# Patient Record
Sex: FEMALE | Race: WHITE | URBAN - METROPOLITAN AREA
[De-identification: names, ages, dates, MRNs, and addresses within clinical notes are randomized per-mention and may not be internally consistent; named-entity substitution may affect disease eponyms.]

---

## 2017-05-17 LAB
ANTIBODY SCREEN, EXTERNAL: NEGATIVE
CHLAMYDIA, EXTERNAL: NEGATIVE
HBSAG, EXTERNAL: NEGATIVE
HIV, EXTERNAL: NON REACTIVE
N. GONORRHEA, EXTERNAL: NEGATIVE
RUBELLA, EXTERNAL: NORMAL
T. PALLIDUM, EXTERNAL: NEGATIVE
TYPE, ABO & RH, EXTERNAL: NORMAL

## 2017-06-15 LAB — GRBS, EXTERNAL: POSITIVE

## 2017-07-01 ENCOUNTER — ANESTHESIA (OUTPATIENT)
Dept: LABOR AND DELIVERY | Age: 32
DRG: 560 | End: 2017-07-01
Payer: MEDICAID

## 2017-07-01 ENCOUNTER — HOSPITAL ENCOUNTER (INPATIENT)
Age: 32
LOS: 3 days | Discharge: HOME | DRG: 560 | End: 2017-07-04
Attending: OBSTETRICS & GYNECOLOGY | Admitting: OBSTETRICS & GYNECOLOGY
Payer: MEDICAID

## 2017-07-01 ENCOUNTER — ANESTHESIA EVENT (OUTPATIENT)
Dept: LABOR AND DELIVERY | Age: 32
DRG: 560 | End: 2017-07-01
Payer: MEDICAID

## 2017-07-01 PROBLEM — Z34.90 PREGNANCY: Status: ACTIVE | Noted: 2017-07-01

## 2017-07-01 LAB
A1 MICROGLOB PLACENTAL VAG QL: POSITIVE
BASOPHILS # BLD AUTO: 0 K/UL (ref 0–0.1)
BASOPHILS # BLD: 0 % (ref 0–1)
CONTROL LINE PRESENT?: YES
DAILY QC (YES/NO)?: YES
EOSINOPHIL # BLD: 0.1 K/UL (ref 0–0.4)
EOSINOPHIL NFR BLD: 1 % (ref 0–7)
ERYTHROCYTE [DISTWIDTH] IN BLOOD BY AUTOMATED COUNT: 13.9 % (ref 11.5–14.5)
EXPIRATION DATE: NORMAL
HCT VFR BLD AUTO: 34.2 % (ref 35–47)
HGB BLD-MCNC: 11.8 G/DL (ref 11.5–16)
INTERNAL NEGATIVE CONTROL: NEGATIVE
KIT LOT NO.: NORMAL
LYMPHOCYTES # BLD AUTO: 16 % (ref 12–49)
LYMPHOCYTES # BLD: 1.5 K/UL (ref 0.8–3.5)
MCH RBC QN AUTO: 31.8 PG (ref 26–34)
MCHC RBC AUTO-ENTMCNC: 34.5 G/DL (ref 30–36.5)
MCV RBC AUTO: 92.2 FL (ref 80–99)
MONOCYTES # BLD: 0.7 K/UL (ref 0–1)
MONOCYTES NFR BLD AUTO: 7 % (ref 5–13)
NEUTS SEG # BLD: 7.4 K/UL (ref 1.8–8)
NEUTS SEG NFR BLD AUTO: 76 % (ref 32–75)
PH, VAGINAL FLUID: 7 (ref 5–6.1)
PLATELET # BLD AUTO: 179 K/UL (ref 150–400)
RBC # BLD AUTO: 3.71 M/UL (ref 3.8–5.2)
WBC # BLD AUTO: 9.7 K/UL (ref 3.6–11)

## 2017-07-01 PROCEDURE — 74011250636 HC RX REV CODE- 250/636

## 2017-07-01 PROCEDURE — 77030007880 HC KT SPN EPDRL BBMI -B

## 2017-07-01 PROCEDURE — 74011000250 HC RX REV CODE- 250

## 2017-07-01 PROCEDURE — 85025 COMPLETE CBC W/AUTO DIFF WBC: CPT | Performed by: OBSTETRICS & GYNECOLOGY

## 2017-07-01 PROCEDURE — 75410000002 HC LABOR FEE PER 1 HR

## 2017-07-01 PROCEDURE — 83986 ASSAY PH BODY FLUID NOS: CPT | Performed by: OBSTETRICS & GYNECOLOGY

## 2017-07-01 PROCEDURE — 74011000250 HC RX REV CODE- 250: Performed by: ANESTHESIOLOGY

## 2017-07-01 PROCEDURE — 77030034849

## 2017-07-01 PROCEDURE — 65410000002 HC RM PRIVATE OB

## 2017-07-01 PROCEDURE — 74011250636 HC RX REV CODE- 250/636: Performed by: OBSTETRICS & GYNECOLOGY

## 2017-07-01 PROCEDURE — 77030003666 HC NDL SPINAL BD -A

## 2017-07-01 PROCEDURE — 84112 EVAL AMNIOTIC FLUID PROTEIN: CPT | Performed by: OBSTETRICS & GYNECOLOGY

## 2017-07-01 PROCEDURE — 74011000258 HC RX REV CODE- 258: Performed by: OBSTETRICS & GYNECOLOGY

## 2017-07-01 PROCEDURE — 77030014125 HC TY EPDRL BBMI -B: Performed by: ANESTHESIOLOGY

## 2017-07-01 PROCEDURE — 36415 COLL VENOUS BLD VENIPUNCTURE: CPT | Performed by: OBSTETRICS & GYNECOLOGY

## 2017-07-01 PROCEDURE — 99283 EMERGENCY DEPT VISIT LOW MDM: CPT

## 2017-07-01 PROCEDURE — 4A0HXCZ MEASUREMENT OF PRODUCTS OF CONCEPTION, CARDIAC RATE, EXTERNAL APPROACH: ICD-10-PCS | Performed by: OBSTETRICS & GYNECOLOGY

## 2017-07-01 RX ORDER — BUPIVACAINE HYDROCHLORIDE AND EPINEPHRINE 2.5; 5 MG/ML; UG/ML
INJECTION, SOLUTION EPIDURAL; INFILTRATION; INTRACAUDAL; PERINEURAL AS NEEDED
Status: DISCONTINUED | OUTPATIENT
Start: 2017-07-01 | End: 2017-07-02 | Stop reason: HOSPADM

## 2017-07-01 RX ORDER — FENTANYL/BUPIVACAINE/NS/PF 2-1250MCG
PREFILLED PUMP RESERVOIR EPIDURAL
Status: COMPLETED
Start: 2017-07-01 | End: 2017-07-01

## 2017-07-01 RX ORDER — NALOXONE HYDROCHLORIDE 0.4 MG/ML
0.4 INJECTION, SOLUTION INTRAMUSCULAR; INTRAVENOUS; SUBCUTANEOUS AS NEEDED
Status: DISCONTINUED | OUTPATIENT
Start: 2017-07-01 | End: 2017-07-02 | Stop reason: HOSPADM

## 2017-07-01 RX ORDER — SODIUM CHLORIDE 0.9 % (FLUSH) 0.9 %
5-10 SYRINGE (ML) INJECTION AS NEEDED
Status: DISCONTINUED | OUTPATIENT
Start: 2017-07-01 | End: 2017-07-02 | Stop reason: ALTCHOICE

## 2017-07-01 RX ORDER — OXYTOCIN IN 5 % DEXTROSE 30/500 ML
1-25 PLASTIC BAG, INJECTION (ML) INTRAVENOUS
Status: DISCONTINUED | OUTPATIENT
Start: 2017-07-01 | End: 2017-07-02

## 2017-07-01 RX ORDER — FENTANYL/BUPIVACAINE/NS/PF 2-1250MCG
1-16 PREFILLED PUMP RESERVOIR EPIDURAL CONTINUOUS
Status: DISCONTINUED | OUTPATIENT
Start: 2017-07-01 | End: 2017-07-02 | Stop reason: ALTCHOICE

## 2017-07-01 RX ORDER — SODIUM CHLORIDE 0.9 % (FLUSH) 0.9 %
5-10 SYRINGE (ML) INJECTION EVERY 8 HOURS
Status: DISCONTINUED | OUTPATIENT
Start: 2017-07-01 | End: 2017-07-02 | Stop reason: ALTCHOICE

## 2017-07-01 RX ORDER — SODIUM CHLORIDE, SODIUM LACTATE, POTASSIUM CHLORIDE, CALCIUM CHLORIDE 600; 310; 30; 20 MG/100ML; MG/100ML; MG/100ML; MG/100ML
125 INJECTION, SOLUTION INTRAVENOUS CONTINUOUS
Status: DISCONTINUED | OUTPATIENT
Start: 2017-07-01 | End: 2017-07-02 | Stop reason: ALTCHOICE

## 2017-07-01 RX ORDER — SODIUM CHLORIDE 900 MG/100ML
INJECTION INTRAVENOUS
Status: DISPENSED
Start: 2017-07-01 | End: 2017-07-02

## 2017-07-01 RX ORDER — PENICILLIN G POTASSIUM 5000000 [IU]/1
INJECTION, POWDER, FOR SOLUTION INTRAMUSCULAR; INTRAVENOUS
Status: COMPLETED
Start: 2017-07-01 | End: 2017-07-01

## 2017-07-01 RX ORDER — BUPIVACAINE HYDROCHLORIDE AND EPINEPHRINE 5; 5 MG/ML; UG/ML
INJECTION, SOLUTION EPIDURAL; INTRACAUDAL; PERINEURAL
Status: COMPLETED
Start: 2017-07-01 | End: 2017-07-01

## 2017-07-01 RX ORDER — SODIUM CHLORIDE 0.9 % (FLUSH) 0.9 %
5-10 SYRINGE (ML) INJECTION AS NEEDED
Status: DISCONTINUED | OUTPATIENT
Start: 2017-07-01 | End: 2017-07-02 | Stop reason: HOSPADM

## 2017-07-01 RX ADMIN — BUPIVACAINE HYDROCHLORIDE AND EPINEPHRINE 5 ML: 2.5; 5 INJECTION, SOLUTION EPIDURAL; INFILTRATION; INTRACAUDAL; PERINEURAL at 19:57

## 2017-07-01 RX ADMIN — SODIUM CHLORIDE, SODIUM LACTATE, POTASSIUM CHLORIDE, AND CALCIUM CHLORIDE 125 ML/HR: 600; 310; 30; 20 INJECTION, SOLUTION INTRAVENOUS at 20:10

## 2017-07-01 RX ADMIN — BUPIVACAINE HYDROCHLORIDE AND EPINEPHRINE 5 ML: 2.5; 5 INJECTION, SOLUTION EPIDURAL; INFILTRATION; INTRACAUDAL; PERINEURAL at 20:02

## 2017-07-01 RX ADMIN — BUPIVACAINE HYDROCHLORIDE AND EPINEPHRINE 0.5 ML: 2.5; 5 INJECTION, SOLUTION EPIDURAL; INFILTRATION; INTRACAUDAL; PERINEURAL at 19:56

## 2017-07-01 RX ADMIN — SODIUM CHLORIDE, SODIUM LACTATE, POTASSIUM CHLORIDE, AND CALCIUM CHLORIDE 999 ML/HR: 600; 310; 30; 20 INJECTION, SOLUTION INTRAVENOUS at 19:41

## 2017-07-01 RX ADMIN — EPHEDRINE SULFATE 10 MG: 50 INJECTION, SOLUTION INTRAVENOUS at 20:33

## 2017-07-01 RX ADMIN — PENICILLIN G POTASSIUM 2.5 MILLION UNITS: 20000000 POWDER, FOR SOLUTION INTRAVENOUS at 20:27

## 2017-07-01 RX ADMIN — FENTANYL 0.2 MG/100ML-BUPIV 0.125%-NACL 0.9% EPIDURAL INJ 12 ML/HR: 2/0.125 SOLUTION at 20:08

## 2017-07-01 RX ADMIN — SODIUM CHLORIDE 5 MILLION UNITS: 900 INJECTION, SOLUTION INTRAVENOUS at 16:30

## 2017-07-01 RX ADMIN — PENICILLIN G POTASSIUM 5 MILLION UNITS: 5000000 POWDER, FOR SOLUTION INTRAMUSCULAR; INTRAPLEURAL; INTRATHECAL; INTRAVENOUS at 17:25

## 2017-07-01 RX ADMIN — Medication 2 MILLI-UNITS/MIN: at 17:20

## 2017-07-01 RX ADMIN — SODIUM CHLORIDE, SODIUM LACTATE, POTASSIUM CHLORIDE, AND CALCIUM CHLORIDE 125 ML/HR: 600; 310; 30; 20 INJECTION, SOLUTION INTRAVENOUS at 16:29

## 2017-07-01 RX ADMIN — BUPIVACAINE HYDROCHLORIDE AND EPINEPHRINE BITARTRATE: 5; .005 INJECTION, SOLUTION EPIDURAL; INTRACAUDAL; PERINEURAL at 20:00

## 2017-07-01 NOTE — IP AVS SNAPSHOT
[address] [facility], [place] 
[phone] Patient: Soni Salinas MRN: [id number] You are allergic to the following No active allergies Recent Documentation Height Weight Breastfeeding? BMI OB Status Smoking Status 1.626 m 81.2 kg Unknown 30.73 kg/m2 Recent pregnancy Never Smoker Unresulted Labs Order Current Status SAMPLE TO BLOOD BANK In process PH BY NITRAZINE, POC Preliminary result POC RUPTURE OF FETAL MEMBRANE (AMNISURE) Preliminary result Emergency Contacts Name Discharge Info Relation Home Work Mobile Edward Rosenberg  Friend [5] About your hospitalization You were admitted on:  July 1, 2017 You last received care in the:  [facility] You were discharged on:  July 4, 2017 Unit phone number:  [phone] Why you were hospitalized Your primary diagnosis was:  Premature Rupture Of Membranes (Prom), Onset Of Labor Within 24 Hours, Antepartum, Full Term Your diagnoses also included:  Pregnancy, Labor And Delivery Complicated By Cord Around Neck With Compression Providers Seen During Your Hospitalizations Provider Role Specialty Primary office phone Frank Waite MD Attending Provider Obstetrics & Gynecology [phone] Your Primary Care Physician (PCP) Primary Care Physician Office Phone Office Fax UNKNOWN, PROVIDER ** None ** ** None ** Follow-up Information Follow up With Details Comments Contact Info Provider Unknown   Patient not available to ask Frank Waite MD In 6 weeks Follow up with [facility]'s in 6 weeks; call to schedule appointment. [address] [facility] [address] [phone] Current Discharge Medication List  
  
START taking these medications Dose & Instructions Dispensing Information Comments Morning Noon Evening Bedtime  
 ibuprofen 600 mg tablet Commonly known as:  MOTRIN Your last dose was: Your next dose is:    
   
   
 Dose:  600 mg Take 1 Tab by mouth every eight (8) hours as needed for Pain. Quantity:  36 Tab Refills:  0 CONTINUE these medications which have NOT CHANGED Dose & Instructions Dispensing Information Comments Morning Noon Evening Bedtime  
 pnv w/o calcium-iron fum-fa 27-1 mg Tab Your last dose was: Your next dose is: Take  by mouth. Indications: Pregnancy Refills:  0 STOP taking these medications   
 fluticasone 50 mcg/actuation nasal spray Commonly known as:  Arnoldo Lorri Where to Get Your Medications Information on where to get these meds will be given to you by the nurse or doctor. ! Ask your nurse or doctor about these medications  
  ibuprofen 600 mg tablet Discharge Instructions POST DELIVERY DISCHARGE INSTRUCTIONS Name: True Torres YOB: 1985 Primary Diagnosis: Principal Problem: 
  Premature rupture of membranes (PROM), onset of labor within 24 hours, antepartum, full term (7/2/2017) Active Problems: 
  Pregnancy (7/1/2017) Labor and delivery complicated by cord around neck with compression (7/2/2017) General:  
 
Diet/Diet Restrictions: 
· Eight 8-ounce glasses of fluid daily (water, juices); avoid excessive caffeine intake. · Meals/snacks as desired which are high in fiber and carbohydrates and low in fat and cholesterol. Physical Activity / Restrictions / Safety: · Avoid heavy lifting, no more that 8 lbs. For 2-3 weeks;  
· Limit use of stairs to 2 times daily for the first week home. · No driving for one week. · Avoid intercourse 4-6 weeks, no douching or tampon use. · Check with obstetrician before starting or resuming an exercise program.   
 
Discharge Instructions/Special Treatment/Home Care Needs:  
 
· Continue prenatal vitamins. · Continue to use squirt bottle with warm water on your episiotomy after each bathroom use until bleeding stops. · If steri-strips applied to your incision, remove in 7-10 days. Call your doctor for the following: · Fever over 101 degrees by mouth. · Vaginal bleeding heavier than a normal menstrual period or clots larger than a golf ball. · Red streaks or increased swelling of legs, painful red streaks on your breast. 
· Painful urination, constipation and increased pain or swelling or discharge with your incision. · If you feel extremely anxious or overwhelmed. · If you have thoughts of harming yourself and/or your baby. Pain Management:  
 
· Take Acetaminophen (Tylenol) or Ibuprofen (Advil, Motrin), as directed for pain. · Use a warm Sitz bath 3 times daily to relieve episiotomy or hemorrhoidal discomfort. · For hemorrhoidal discomfort, use Tucks and Anusol cream as needed and directed. · Heating pad to  incision as needed. Follow-Up Care:  
 
Appointment with MD: Follow-up Appointments Procedures  FOLLOW UP VISIT Appointment in: 6 Weeks Standing Status:   Standing Number of Occurrences:   1 Order Specific Question:   Appointment in Answer:   6 Weeks Telephone number: [phone] Signed By: Harry Quintana MD                                                                                                   Date: 2017 Time: 8:53 AM 
 
Vaginal Childbirth (Postpartum Care): After Your Visit Your Care Instructions After childbirth (postpartum period), your body goes through many changes. Some of these changes happen over several weeks.   
 
It is easy to get too tired and overwhelmed during the first weeks after childbirth. Take it easy on yourself. You may find it hard to meet the extra demands on your energy and time. Get rest whenever you can, accept help from others, and eat well and drink plenty of fluids. Your body will slowly heal in the next few weeks. You may feel emotional during this time. Changes in your hormones can shift your mood without warning. No heavy lifting. No more than 8 lbs or the size of baby for 2-3 weeks. Avoid stairs. Limit to 1-2 times per day for the 1st week after delivery. No driving for the first week after delivery. Follow-up care is a key part of your treatment and safety. Be sure to make and go to all appointments, and call your doctor if you are having problems. Its also a good idea to know your test results and keep a list of the medicines you take. Around 4 to 6 weeks after your baby's birth, you will have a follow-up visit with your doctor. This visit is your time to talk to your doctor about anything you are concerned or curious about. Keep a list of questions to bring to your postpartum visit. Your questions might be about: 
Changes in your breasts, such as lumps or soreness. When to expect your menstrual period to start again. What form of birth control is best for you. Weight you have put on during the pregnancy. Exercise options. What foods and drinks are best for you, especially if you are breast-feeding. Problems you might be having with breast-feeding. When you can have sex. Some women may want to talk about lubricants for the vagina. Any feelings of sadness or restlessness that you are having. How can you care for yourself at home? Vaginal bleeding and cramps After delivery, you will have a bloody discharge from the vagina. This will turn pink within a week and then white or yellow after about 10 days. It may last for 2 to 4 weeks or longer, until the uterus has healed. Do not rinse inside your vagina with fluids (douche). Do not worry if you pass some blood clots, as long as they are smaller than a golf ball. If you have a tear or stitches in your vaginal area, change the pad at least every 4 hours to prevent soreness and infection. You may have cramps for the first few days after childbirth. These are normal and occur as the uterus shrinks to normal size. Take an over-the-counter pain medicine, such as acetaminophen (Tylenol), ibuprofen (Advil, Motrin), or naproxen (Aleve), for cramps. Read and follow all instructions on the label. Do not take aspirin, because it can cause more bleeding. Do not take two or more pain medicines at the same time unless the doctor told you to. Many pain medicines have acetaminophen, which is Tylenol. Too much acetaminophen (Tylenol) can be harmful. Stitches If you have stitches, they will dissolve on their own and do not need to be removed. Follow your doctor's instructions for cleaning the stitched area. Put ice or a cold pack on your painful area for 10 to 20 minutes at a time. , several times a day, for the first few days. Put a thin cloth between the ice and your skin. Sit in a few inches of warm water (sitz bath) 3 times a day and after bowel movements. The warm water helps with pain and itching. If you do not have a tub, a warm shower might help. Keep the area clean by pouring or spraying warm water over the area outside your vagina and anus after you use the toilet. Breast fullness Your breasts may overfill (engorge) in the first few days after delivery. To help milk flow and to relieve pain, warm your breasts in the shower or by using warm, moist towels before nursing. If you are not nursing, do not put warmth on your breasts or touch your breasts. Wear a tight bra or sports bra and use ice until the fullness goes away. This usually takes 2 to 3 days. Put ice or a cold pack on your breast after nursing to reduce swelling and pain. Put a thin cloth between the ice and your skin. Activity Eat a balanced diet. Do not try to lose weight by cutting calories. Keep taking your prenatal vitamins, or take a multivitamin. ·  Get help with household chores from family or friends, if you can. Do not try to do it all yourself. Get as much rest as you can. Try to take naps when your baby sleeps during the day. Get some exercise every day. But do not do any heavy exercise until your doctor says it is okay. Do not have sex or use tampons until you have stopped bleeding and at least 4 to 6 weeks have passed since you gave birth. Talk to your doctor about birth control. You can get pregnant even before your period returns. Also, you can get pregnant while you are breast-feeding. Mental health It is normal to have some sadness, anxiety, sleeplessness, and mood swings after you go home. If you feel upset or hopeless for more than a few days or are having trouble doing the things you need to do, talk to your doctor. · Many women get the \"baby blues\" during the first few days after childbirth. The \"baby blues\" usually peak around the fourth day and then ease up in less than 2 weeks. If you have the \"baby blues\" for more than a few days, or if you have thoughts of hurting yourself or your baby, call your doctor right away. Constipation and hemorrhoids Drink plenty of fluids, enough so that your urine is light yellow or clear like water. If you have kidney, heart, or liver disease and have to limit fluids, talk with your doctor before you increase the amount of fluids you drink. Eat plenty of fiber each day to avoid constipation. Include foods such as whole-grain breads and cereals, raw vegetables, raw and dried fruits, and beans. · If you have hemorrhoids or swelling or pain around the opening of your vagina, try using cold and heat. You can put ice or a cold pack on the area for 10 to 20 minutes at a time.  Put a thin cloth between the ice and your skin. Also try sitting in a few inches of warm water (sitz bath) 3 times a day and after bowel movements. When should you call for help? Call 911 anytime you think you may need emergency care. For example, call if: 
You are thinking of hurting yourself, your baby, or anyone else. You have sudden, severe pain in your belly. You passed out (lost consciousness). Call your doctor now or seek immediate medical care if: 
You have severe vaginal bleeding. You are passing blood clots and soaking through a pad each hour for 2 or more hours. Your vaginal bleeding seems to be getting heavier or is still bright red 4 days after delivery, or you pass blood clots larger than the size of a golf ball. You are dizzy or lightheaded, or you feel like you may faint. You are vomiting or cannot keep fluids down. You have a fever. You have new or more belly pain. You pass tissue (not just blood). Your vaginal discharge smells bad. Your belly feels tender or full and hard. Your breasts are continuously painful or red. You feel sad, anxious, or hopeless for more than a few days. Watch closely for changes in your health, and be sure to contact your doctor if you have any problems. Where can you learn more? Go to eROI.be Enter D443  in the search box to learn more about \"Postpartum Care: After Your Visit\". or 
 
Go to eROI.be Enter X759  in the search box to learn more about \"Vaginal Childbirth: After Your Visit\". © 2893-3869 [employer]. Care instructions adapted under license by [facility] (which disclaims liability or warranty for this information). This care instruction is for use with your licensed healthcare professional. If you have questions about a medical condition or this instruction, always ask your healthcare professional. Natalee Rayoen any warranty or liability for your use of this information. Follow up with [facility]'s in 6 weeks; call to schedule appointment. Discharge Orders None [employer] Announcement We are excited to announce that we are making your provider's discharge notes available to you in [employer]. You will see these notes when they are completed and signed by the physician that discharged you from your recent hospital stay. If you have any questions or concerns about any information you see in [employer], please call the Health Information Department where you were seen or reach out to your Primary Care Provider for more information about your plan of care. Introducing [facility]! [address] [facility] introduces [employer] patient portal. Now you can access parts of your medical record, email your doctor's office, and request medication refills online. 1. In your internet browser, go to [email or web address] 2. Click on the First Time User? Click Here link in the Sign In box. You will see the New Member Sign Up page. 3. Enter your [employer] Access Code exactly as it appears below. You will not need to use this code after youve completed the sign-up process. If you do not sign up before the expiration date, you must request a new code. · [employer] Access Code: [email or web address] Expires: 10/2/2017  9:15 AM 
 
4. Enter the last four digits of your Social Security Number (xxxx) and Date of Birth (mm/dd/yyyy) as indicated and click Submit. You will be taken to the next sign-up page. 5. Create a [employer] ID. This will be your [employer] login ID and cannot be changed, so think of one that is secure and easy to remember. 6. Create a [employer] password. You can change your password at any time. 7. Enter your Password Reset Question and Answer. This can be used at a later time if you forget your password. 8. Enter your e-mail address. You will receive e-mail notification when new information is available in [address] 9. Click Sign Up. You can now view and download portions of your medical record. 10. Click the Download Summary menu link to download a portable copy of your medical information. If you have questions, please visit the Frequently Asked Questions section of the [employer] website. Remember, [employer] is NOT to be used for urgent needs. For medical emergencies, dial 911. Now available from your iPhone and Android! General Information Please provide this summary of care documentation to your next provider. Patient Signature:  ____________________________________________________________ Date:  ____________________________________________________________  
  
Saran Matsu Provider Signature:  ____________________________________________________________ Date:  ____________________________________________________________

## 2017-07-01 NOTE — PROGRESS NOTES
at 38  Presents to triage with c/o LOF     Pt had a h/o MRSA in   Negative culture in office 06/15/17

## 2017-07-01 NOTE — PROGRESS NOTES
1903 bedside report to Sultana Jones, RNC. Care turned over at this time  [facility] at 0700 clear fluid no vaginal bleeding.

## 2017-07-01 NOTE — PROGRESS NOTES
Bedside report received from [facility]  and care assumed. Report given with SBAR , recent labs, last cervical exam  and MAR . Pt currently on 4 mu pitocin . Pt desires epidural , fluid bolus initiated      1945 Dr. Alfred Dennison here for epidural     0150 BP 85/47 fluid bolus initiated abd cuff adjusted ,  pitocin remains at 14 mu      0730 TRANSFER - OUT REPORT:    Verbal report given to [facility] (name) on [employer]  being transferred to [facility]U(unit) for routine progression of care       Report consisted of patients Situation, Background, Assessment and   Recommendations(SBAR). Information from the following report(s) SBAR, Kardex and MAR was reviewed with the receiving nurse. Lines:   Peripheral IV 07/01/17 Left Hand (Active)   Site Assessment Clean, dry, & intact 7/1/2017  8:28 PM   Phlebitis Assessment 0 7/1/2017  8:28 PM   Infiltration Assessment 0 7/1/2017  8:28 PM   Dressing Status Clean, dry, & intact 7/1/2017  8:28 PM   Dressing Type Tape;Transparent 7/1/2017  8:28 PM   Hub Color/Line Status Green; Infusing 7/1/2017  8:28 PM   Action Taken Blood drawn 7/1/2017  8:28 PM   Alcohol Cap Used Yes 7/1/2017  8:28 PM        Opportunity for questions and clarification was provided.

## 2017-07-01 NOTE — H&P
EDC:2017  EGA: 38 weeks, 6 days      History of Present Illness:  Spontaneous premature rupture of membranes around 0700 hrs today. No bleeding, mild almost painless contractions now. Patient's Prenatal Care with Doctor of Record Bennett Anderson MD Notable For -    GBS positive RX in labor  IUGR (8% at 36wks), nml dopplers   lab screening  MRSA Pregnant-3rd Trim Cult (neg)  RH negative rhogam 28 wks (given )  High risk pregnancy insufficient PNC  Normal pregnancy multigravida  ROUTINE GYN no problems          Impression & Recommendations:    Problem # 1:  Term PROM  Assessment: New  Admit. Recomment oxytocin augmentation of labor. Will want epidural.    Problem # 2:  GBS positive RX in labor (KLJ-127.06) (LFV02-R04.58)  Assessment: Unchanged  IV penicillin G in labor. Past Pregnancy History      :  5     Term Births:  1     Premature Births: 0     Living Children: 2     Para:   2     Prev : 0     Aborta:  2     Elect. Ab:  0     Spont.  Ab:  2     Ectopics:  0    Pregnancy # 1     Delivery date:   2007     Weeks Gestation: 40     Delivery type:        Hours of labor:   24 hrs     Anesthesia type:   epidural     Delivery location:   [facility]     Infant Sex:  Male     Birth weight:  5-8     Name:  Yamila Weisser    Pregnancy # 2     Comments:  SAB    Pregnancy # 3     Comments:  SAB    Pregnancy # 4     Delivery date:   2012     Weeks Gestation: 40     Delivery type:        Hours of labor:   18 hrs     Anesthesia type:   spinal     Delivery location:   [facility]     Infant Sex:  Male     Birth weight:  6lb     Name:  Sapna Carlos        Past Medical History:     Reviewed history from 2011 and no changes required:        ?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 Previous Tobacco Use: Signed On - 05/17/2017  Smoked Tobacco Use:  Never smoker     Counseled to quit/cut down:  yes  Passive smoke exposure:  no  Drug use:  no    Previous Alcohol Use: Signed On - 05/17/2017  Alcohol use:  no  Exercise:  no  Seatbelt use:  50 %  Sun Exposure:  occasionally      Review of Systems     Except as noted in the HPI, the review of systems is negative for General, Breast, , CV, Resp, GI, Endo, MS, Derm, Neuro, Psych, Eyes, ENT, Allergy and Heme. Allergies      Medications Removed from Medication List        Flowsheet View for Follow-up Visit     Estimated weeks of        gestation:  38 6/7     Fundal height:    30 cm     Fetal position:  cephalic     Cx Dilation:  1-2 cm     Cx Effacement: 60%     Cx Station:  -2          Physical Exam     General           General appearance:  no acute distress    Head           Inspection:   normal    Eyes           External:   EOM intact    ENT           Dental:   adequate dentition    Chest           Lungs:  normal rate and effort. Heart:  regular rate and rhythm    Extremeties           Extremeties:  0 edema    Neurological           Reflexes:  2+ and symmetric with no pathological reflexes    Psych           Orientation:  oriented to time, place, and person          Mood:  no appearance of anxiety, depression, or agitation    Lymph           Inguinal:  no inguinal adenopathy    Skin           Inspection:  no rashes, suspicious lesions, or ulcerations    Abdomen           Abdomen:  gravid uterus. FH 30 cm, mild contractions q 2-4 minutes. , category 1.           Fundal Height:  30 cm          EFW:  6 pounds    Pelvic Exam           EGBUS:  no lesions          Vagina:  clear amniotic fluid          Uterus:  gravid          Adnexa:  non palpable          Cervix:  no lesions or discharge                  Dilation: : 1-2 cm                  Effacement:  60%                  Station:  -2                  Presentation:  cephalic                  Membranes:  ruptured                  Pelvis:  tested                  Pelvis Tested to:  6 lbs                  Consistency:  soft                  Position: mid            Impression & Recommendations:    Problem # 1:  Term PROM  Assessment: New  Admit. Recomment oxytocin augmentation of labor. Will want epidural.    Problem # 2:  GBS positive RX in labor (ZMB-690.18) (RMH83-X83.54)  Assessment: Unchanged  IV penicillin G in labor.       Medications (at conclusion of this visit)    03/10/2009 PNV-DHA CAPS (PRENAT W/O I-HI-ZCABZEK-FA-DHA CAPS) 1 po qd          LABORATORY DATA   TEST DATE RESULT   Group B Strep culture 06/15/2017 Positive                                   (Group B Strep Culture Result Field)   Blood Type 05/17/2017 A                                             (Blood Type Result Field)   Rh 05/17/2017 Negative                                   (Rh Result Field)   Rhogam Inj Given 05/31/2017 Full dose   Tdap Vaccine Given 05/17/2017 declined   Antibody Screen 05/17/2017 Negative   Rubella  [facility] Reference Ranges On or After 3/10/14                  <0.90              Non-immune      0.90 - 0.99     Equivocal      >0.99              Immune    [facility] Reference Ranges  Before 3/10/14           <5                 Non-immune             5 - 9               Equivocal            >9                 Immune  [facility] Reference Ranges       < Or = 0.90       Negative             0.91-1.09          Equivocal            > Or = 1.10       Positive   05/17/2017     1.52     TPA (T Pallidum Antibodies) 05/17/2017 Negative   Serology (RPR)     HBsAg 05/17/2017 Negative   HIV 05/17/2017 Non Reactive   Hemoglobin 05/17/2017 12.5   Hematocrit 05/17/2017 37.3   Platelets 20/82/6804 241 X10E3/UL   TSH 06/01/2011 3.240   Urine Culture 05/31/2017 Negative   GC DNA Probe 05/17/2017 Negative   Chlamydia DNA 05/17/2017 Negative   PAP 05/17/2017 NIL   Flu Vaccine Given     HGBA1C     HGB Electro 05/17/2017 AA   T4, Free     BG Fasting     GTT 1H 50G 05/31/2017 100   GTT 1H 100G     GTT 2H 100G     GTT 3H 100G     Glucose Plasma     CF Accept or Decline 05/17/2017 declined   CF Screen Result     Nuchal Trans 05/17/2017 4.75^4. 75 mm&millimeters   AFP Only     Tetra 05/17/2017 Too Late   AFP Serum     CVS     AFP Amniotic     Amnio Karyo     FISH     GC Culture     Chlamydia Cult     Ureaplasma     Mycoplasma     WBC 05/17/2017 10.6 X10E3/UL   RBC 05/17/2017 3.76 X10E6/UL   MCV 05/17/2017 99   MCH 05/17/2017 33.2   MCHC RBC 05/17/2017 33.5     ULTRASOUND DATA   TEST DATE RESULT   Estimated Fetal Weight 06/28/2017 4265.20300580^4676 g&grams                                     Weight % 06/28/2017 18^18% %&percent                                                KEITH 06/28/2017 11.09^11.1 cm&centimeters                    BPP 06/28/2017 8^8 [n/a]&Not applicable   Cervical Length (mm)             Electronically signed by Anika Sen MD on 07/01/2017 at 5:21 PM    ________________________________________________________________________

## 2017-07-01 NOTE — PROGRESS NOTES
1518 pt on EFMx2 VS obtained 98.3-86-18 122/57 98%  with accels. Pt states pain in abdomen 5/10   Leaking clear fluid started at 0700     nitrazine 7.0   amnisure questionable 2nd line present very faint. Pt sees Dr. Jacquelyn Myles [id number]   A negative GBS+ rubella immune  Hx of MRSA 5-6 yrs ago negative culture in [facility] office with this pregnancy.

## 2017-07-01 NOTE — PROGRESS NOTES
Dr. Garcia Creed in room to see pt & do SVE   1cm+ 60% -2 vertex. 1717 Iv Pitocin started 30 units in 500cc.

## 2017-07-02 PROCEDURE — 75410000003 HC RECOV DEL/VAG/CSECN EA 0.5 HR

## 2017-07-02 PROCEDURE — 75410000002 HC LABOR FEE PER 1 HR

## 2017-07-02 PROCEDURE — 74011250637 HC RX REV CODE- 250/637: Performed by: OBSTETRICS & GYNECOLOGY

## 2017-07-02 PROCEDURE — 74011250636 HC RX REV CODE- 250/636: Performed by: ANESTHESIOLOGY

## 2017-07-02 PROCEDURE — 65410000002 HC RM PRIVATE OB

## 2017-07-02 PROCEDURE — 3E0R3CZ INTRODUCE REGIONAL ANESTH IN SPINAL CANAL, PERC: ICD-10-PCS | Performed by: ANESTHESIOLOGY

## 2017-07-02 PROCEDURE — 74011250636 HC RX REV CODE- 250/636: Performed by: OBSTETRICS & GYNECOLOGY

## 2017-07-02 PROCEDURE — 76060000078 HC EPIDURAL ANESTHESIA

## 2017-07-02 PROCEDURE — 77030009363 HC CUP VAC EXTRCT CNCI -B

## 2017-07-02 PROCEDURE — 00HU33Z INSERTION OF INFUSION DEVICE INTO SPINAL CANAL, PERCUTANEOUS APPROACH: ICD-10-PCS | Performed by: ANESTHESIOLOGY

## 2017-07-02 PROCEDURE — 75410000000 HC DELIVERY VAGINAL/SINGLE

## 2017-07-02 RX ORDER — SODIUM CHLORIDE 9 MG/ML
INJECTION INTRAMUSCULAR; INTRAVENOUS; SUBCUTANEOUS
Status: DISPENSED
Start: 2017-07-02 | End: 2017-07-02

## 2017-07-02 RX ORDER — IBUPROFEN 400 MG/1
800 TABLET ORAL EVERY 8 HOURS
Status: DISCONTINUED | OUTPATIENT
Start: 2017-07-02 | End: 2017-07-04 | Stop reason: HOSPADM

## 2017-07-02 RX ORDER — OXYCODONE AND ACETAMINOPHEN 5; 325 MG/1; MG/1
1 TABLET ORAL
Status: DISCONTINUED | OUTPATIENT
Start: 2017-07-02 | End: 2017-07-04 | Stop reason: HOSPADM

## 2017-07-02 RX ORDER — ZOLPIDEM TARTRATE 5 MG/1
5 TABLET ORAL
Status: DISCONTINUED | OUTPATIENT
Start: 2017-07-02 | End: 2017-07-04 | Stop reason: HOSPADM

## 2017-07-02 RX ORDER — HYDROCORTISONE ACETATE PRAMOXINE HCL 2.5; 1 G/100G; G/100G
CREAM TOPICAL AS NEEDED
Status: DISCONTINUED | OUTPATIENT
Start: 2017-07-02 | End: 2017-07-04 | Stop reason: HOSPADM

## 2017-07-02 RX ORDER — OXYTOCIN IN 5 % DEXTROSE 30/500 ML
50 PLASTIC BAG, INJECTION (ML) INTRAVENOUS CONTINUOUS
Status: ACTIVE | OUTPATIENT
Start: 2017-07-02 | End: 2017-07-02

## 2017-07-02 RX ORDER — DOCUSATE SODIUM 100 MG/1
100 CAPSULE, LIQUID FILLED ORAL
Status: DISCONTINUED | OUTPATIENT
Start: 2017-07-02 | End: 2017-07-04 | Stop reason: HOSPADM

## 2017-07-02 RX ORDER — DIPHENHYDRAMINE HCL 25 MG
25 CAPSULE ORAL
Status: DISCONTINUED | OUTPATIENT
Start: 2017-07-02 | End: 2017-07-04 | Stop reason: HOSPADM

## 2017-07-02 RX ORDER — ONDANSETRON 4 MG/1
4 TABLET, ORALLY DISINTEGRATING ORAL
Status: DISCONTINUED | OUTPATIENT
Start: 2017-07-02 | End: 2017-07-04 | Stop reason: HOSPADM

## 2017-07-02 RX ORDER — NALOXONE HYDROCHLORIDE 0.4 MG/ML
0.4 INJECTION, SOLUTION INTRAMUSCULAR; INTRAVENOUS; SUBCUTANEOUS AS NEEDED
Status: DISCONTINUED | OUTPATIENT
Start: 2017-07-02 | End: 2017-07-04 | Stop reason: HOSPADM

## 2017-07-02 RX ADMIN — PENICILLIN G POTASSIUM 2.5 MILLION UNITS: 20000000 POWDER, FOR SOLUTION INTRAVENOUS at 00:03

## 2017-07-02 RX ADMIN — IBUPROFEN 800 MG: 400 TABLET, FILM COATED ORAL at 10:01

## 2017-07-02 RX ADMIN — Medication 10 ML: at 10:01

## 2017-07-02 RX ADMIN — Medication 50 MILLI-UNITS/MIN: at 05:06

## 2017-07-02 RX ADMIN — PENICILLIN G POTASSIUM 2.5 MILLION UNITS: 20000000 POWDER, FOR SOLUTION INTRAVENOUS at 03:30

## 2017-07-02 RX ADMIN — OXYCODONE HYDROCHLORIDE AND ACETAMINOPHEN 1 TABLET: 5; 325 TABLET ORAL at 15:22

## 2017-07-02 RX ADMIN — SODIUM CHLORIDE, SODIUM LACTATE, POTASSIUM CHLORIDE, AND CALCIUM CHLORIDE 125 ML/HR: 600; 310; 30; 20 INJECTION, SOLUTION INTRAVENOUS at 02:05

## 2017-07-02 RX ADMIN — FENTANYL 0.2 MG/100ML-BUPIV 0.125%-NACL 0.9% EPIDURAL INJ 12 ML/HR: 2/0.125 SOLUTION at 03:28

## 2017-07-02 RX ADMIN — IBUPROFEN 800 MG: 400 TABLET, FILM COATED ORAL at 17:38

## 2017-07-02 NOTE — ROUTINE PROCESS
Bedside shift change report given to Marley Law RN (oncoming nurse) by Kalyan Hughes RN (offgoing nurse). Report included the following information SBAR, Procedure Summary, Intake/Output, MAR and Recent Results.

## 2017-07-02 NOTE — L&D DELIVERY NOTE
Delivery Summary  Patient: Hollie Mckeon             Circumcision:   desires  Additional Delivery Comments - Vertex descended to perineum as direct OA, but fetal bradycardia developed with the vertex just short of . The situation was discussed with the patient who consented to use of the vacuum extractor to expedite delivery. The [facility] Pump cup was applied, vacuum was taken to 550 mmhg, with an outlet vacuum delivery using one pull over an intact perineum. A moderately tight single loop of nuchal cord could not be reduced, so the baby was delivered through the loop. Delayed cord clamping was practiced; three cord blood vessels were seen. Oxytocin was added to the IV fluids and umbilical cord blood was obtained for the lab. The placenta delivered spontaneously, intact, via the [employer]; no cord or placental abnormalities were seen. The patient sustained no lacerations or abrasions. Sponge and needle counts were correct.  ml.       Information for the patient's :  Alicja Harrington [[id number]]       Labor Events:    Labor: No   Rupture Date: 2017   Rupture Time: 4:32 AM   Rupture Type AROM   Amniotic Fluid Volume:      Amniotic Fluid Description: Clear None   Induction:         Augmentation: Oxytocin   Labor Events:       Cervical Ripening:     None     Delivery Events:  Episiotomy: None   Laceration(s): None     Repaired: None    Number of Repair Packets:     Suture Type and Size: None     Estimated Blood Loss (ml): 300ml       Delivery Date: 2017    Delivery Time: 4:35 AM  Delivery Type: Vaginal, Vacuum (Extractor)  Sex:  Male     Gestational Age: 38w7d   Delivery Clinician:  Cortez Gavin  Living Status:     Delivery Location: L&D            APGARS  One minute Five minutes Ten minutes   Skin color: 1   2        Heart rate: 2   2        Grimace: 2   2        Muscle tone: 2   2        Breathin   2        Totals: 9   10            Presentation: Vertex    Position:   Occiput Anterior  Resuscitation Method:        Meconium Stained:        Cord Vessels: 3 Vessels      Cord Events:    Cord Blood Sent?:  Yes    Blood Gases Sent?:  No    Placenta:  Date/Time:   4:42 AM  Removal: Spontaneous      Appearance: Normal;Other (comment)     Carney Measurements:  Birth Weight: 2.865 kg      Birth Length: 49.5 cm      Head Circumference: 32.5 cm      Chest Circumference: 32 cm     Abdominal Girth: 31.5 cm    Other Providers:   Destini TERRELL;;;;;;;;, Obstetrician;Primary Nurse;Primary Carney Nurse;Nicu Nurse;Neonatologist;Anesthesiologist;Crna;Nurse Practitioner;Nursery Nurse           Cord pH:  none    Episiotomy: None   Laceration(s): None     Estimated Blood Loss (ml): 300    Labor Events  Method:        Augmentation: Oxytocin   Cervical Ripening:       None        Operative Vaginal Delivery - See the description above.     Group B Strep:   Lab Results   Component Value Date/Time    GrBStrep, External positive  06/15/2017     Information for the patient's :  Lolis Darshana [[id number]]   No results found for: ABORH, PCTABR, PCTDIG, BILI, ABORHEXT, ABORH    No results found for: APH, APCO2, APO2, AHCO3, ABEC, ABDC, O2ST, EPHV, PCO2V, PO2V, HCO3V, EBEV, EBDV, SITE, RSCOM

## 2017-07-02 NOTE — PROGRESS NOTES
Pt assisted x2 up to bathroom. Pt ambulated well without difficulty or dizziness. Pt voided un measurable amount immediately upon sitting on commode. Pt then showered without difficulty and was then taken by wheelchair with infant and family to postpartum room.

## 2017-07-02 NOTE — PROGRESS NOTES
Post-Partum Day Number 0 Progress Note    Naa Marin     Assessment: Doing well, post partum day 0 s/p VAVD (delivered at 0438 this morning)    Plan:  1. Continue routine postpartum and perineal care as well as maternal education. 2. Desires circumcision - will have performed on DOL 1    Information for the patient's :  James Mcfarland [[id number]]   Vaginal, Vacuum (Extractor)    S: Patient doing well without significant complaint. Voiding without difficulty, normal lochia. About to get up to void for the first time. Vitals:  Visit Vitals    /58    Pulse 77    Temp 98.2 °F (36.8 °C)    Resp 18    Ht 5' 4\" (1.626 m)    Wt 81.2 kg (179 lb)    SpO2 98%    Breastfeeding Unknown    BMI 30.73 kg/m2     Temp (24hrs), Av.5 °F (36.9 °C), Min:98 °F (36.7 °C), Max:99.3 °F (37.4 °C)        Exam:   Patient without distress. Abdomen soft, fundus firm, nontender                Perineum with normal lochia noted. Lower extremities are negative for swelling, cords or tenderness.     Labs:     Lab Results   Component Value Date/Time    WBC 9.7 2017 04:31 PM    WBC 9.9 2012 01:15 AM    WBC 4.9 2010 02:35 PM    WBC 7.2 2009 12:30 AM    WBC 6.6 2009 12:35 AM    HGB 11.8 2017 04:31 PM    HGB 11.9 2012 01:15 AM    HGB 13.2 2010 02:35 PM    HGB 13.3 2009 12:30 AM    HGB 13.6 2009 12:35 AM    HCT 34.2 2017 04:31 PM    HCT 34.5 2012 01:15 AM    HCT 37.6 2010 02:35 PM    HCT 39.5 2009 12:30 AM    HCT 39.2 2009 12:35 AM    PLATELET 252  04:31 PM    PLATELET 916  01:15 AM    PLATELET 771  02:35 PM    PLATELET 163  12:30 AM    PLATELET 527 6004 12:35 AM       Recent Results (from the past 24 hour(s))   PH BY NITRAZINE, POC    Collection Time: 17  3:25 PM   Result Value Ref Range    pH-Nitrazine paper 7.0 (A) 5.0 - 6.1    Daily QC performed? Yes    POC RUPTURE OF FETAL MEMBRANE (AMNISURE)    Collection Time: 07/01/17  3:30 PM   Result Value Ref Range    Rupture of fetal membrane Positive Negative    Control line present? Yes     Internal neg. control Negative     Kit Lot No. [id number]     Expiration date 9/17/2019    CBC WITH AUTOMATED DIFF    Collection Time: 07/01/17  4:31 PM   Result Value Ref Range    WBC 9.7 3.6 - 11.0 K/uL    RBC 3.71 (L) 3.80 - 5.20 M/uL    HGB 11.8 11.5 - 16.0 g/dL    HCT 34.2 (L) 35.0 - 47.0 %    MCV 92.2 80.0 - 99.0 FL    MCH 31.8 26.0 - 34.0 PG    MCHC 34.5 30.0 - 36.5 g/dL    RDW 13.9 11.5 - 14.5 %    PLATELET 952 428 - 285 K/uL    NEUTROPHILS 76 (H) 32 - 75 %    LYMPHOCYTES 16 12 - 49 %    MONOCYTES 7 5 - 13 %    EOSINOPHILS 1 0 - 7 %    BASOPHILS 0 0 - 1 %    ABS. NEUTROPHILS 7.4 1.8 - 8.0 K/UL    ABS. LYMPHOCYTES 1.5 0.8 - 3.5 K/UL    ABS. MONOCYTES 0.7 0.0 - 1.0 K/UL    ABS. EOSINOPHILS 0.1 0.0 - 0.4 K/UL    ABS.  BASOPHILS 0.0 0.0 - 0.1 K/UL

## 2017-07-02 NOTE — LACTATION NOTE
This note was copied from a baby's chart. 5 hours of life. Brief latch/1 minute after returned to mother from nbn and blood work. Temp 97.8, swaddled with hat on. Arousing with fingers to mouth, mother receptive to skin to skin and LC instruction for initiating latch with baby led cues. 1st 2 attempted breastfeeding however mother stopped at 1-2 weeks. Manual massage, compression and expression of milk instructed to lead each feeding. Benefits of increasing milk production as well as milk transfer to baby with this stimulation process reviewed. Infant is soothed and remains sleepy, dysrhythmic finger suckle assessment. Encouraged to remain skin to skin and let baby lead feeding. Breast pump benefit reviewed. Noted Medicaid recipient and will ask about [facility] benefit/participation as well. Support group/[facility] for outpatient follow up. Bedside I/0 record initiated and updated. Reviewed daily weights and recording feedings. Importance of knowing how your baby is getting enough through I/0 and daily wts. Will continue to follow and encourage.

## 2017-07-02 NOTE — ANESTHESIA PROCEDURE NOTES
CSE Block    Performed by: Radha Howard  Authorized by: Radha Howard     Pre-Procedure      OB Combined Spinal-Epidural Note    Risk and benefits were discussed with the patient and plans are to proceed. Patient was placed in the seated position. The back was prepped at the lumbar region with Duraprep. 3 ml 0.25% bupivacaine with 1:200K epinephrine  was used as local at L3 - L4. A 17 Tuohy needle was passed x 1 attempt(s) at the above stated level. Loss of resistance at 5 cm. A 25 g pencil point spinal needle was placed through the Touhy until CSF was obtained. 0.5 mL 0.25% bupivacaine with 1:200K epinephrine was deposited into the CSF. A 19 g spring type epidural catheter was placed through the Touhy and secured at 10 cm at the skin. Test dose with 5 mL 0.25% bupivacaine with 1:200 epinephrine was negative. No paresthesia.     Buck Monson MD  7/1/2017

## 2017-07-02 NOTE — ROUTINE PROCESS
Bedside shift change report given to Octavio Laguerre RN (oncoming nurse) by MALATHI Velasquez RN (offgoing nurse). Report included the following information SBAR, Procedure Summary, Intake/Output, MAR and Recent Results.

## 2017-07-02 NOTE — PROGRESS NOTES
Comfortable with epidural; BP down a little, getting ephedrine. Currently on 4 mu/min oxytocin. Contraction pattern is irregular. EXAM at 2042:  Cervix remains posterior, 2 cm dilated, 60% effaced, with -2 station vertex, well applied to cervix. IMPRESSION:  Slow progress. PLAN:  Continue oxytocin.

## 2017-07-02 NOTE — ANESTHESIA PREPROCEDURE EVALUATION
Anesthetic History   No history of anesthetic complications            Review of Systems / Medical History  Patient summary reviewed, nursing notes reviewed and pertinent labs reviewed    Pulmonary  Within defined limits                 Neuro/Psych   Within defined limits           Cardiovascular  Within defined limits                Exercise tolerance: >4 METS     GI/Hepatic/Renal  Within defined limits              Endo/Other  Within defined limits           Other Findings   Comments: IUP           Physical Exam    Airway  Mallampati: II  TM Distance: 4 - 6 cm  Neck ROM: normal range of motion   Mouth opening: Normal     Cardiovascular               Dental  No notable dental hx       Pulmonary                 Abdominal         Other Findings            Anesthetic Plan    ASA: 2  Anesthesia type: epidural and spinal            Anesthetic plan and risks discussed with: Patient

## 2017-07-02 NOTE — PROGRESS NOTES
Now on 14 mu/min oxytocin. Contractions q 2 minutes, , category 1. EXAM at 0108:  Cervix 4 cm dilated, 80% effaced, with ROP to OP vertex at -2 station. IMPRESSION:  Slowly progressing. PLAN:  Continue oxytocin.

## 2017-07-03 PROCEDURE — 65410000002 HC RM PRIVATE OB

## 2017-07-03 PROCEDURE — 74011250637 HC RX REV CODE- 250/637: Performed by: OBSTETRICS & GYNECOLOGY

## 2017-07-03 RX ADMIN — IBUPROFEN 800 MG: 400 TABLET, FILM COATED ORAL at 02:54

## 2017-07-03 RX ADMIN — IBUPROFEN 800 MG: 400 TABLET, FILM COATED ORAL at 20:35

## 2017-07-03 RX ADMIN — IBUPROFEN 800 MG: 400 TABLET, FILM COATED ORAL at 11:53

## 2017-07-03 NOTE — ANESTHESIA POSTPROCEDURE EVALUATION
Post-Anesthesia Evaluation and Assessment    Patient: Elio Cornelius MRN: [id number]  SSN: [id number]    YOB: 1985  Age: 29 y.o. Sex: female       Cardiovascular Function/Vital Signs  Visit Vitals    /68 (BP 1 Location: Right arm, BP Patient Position: Sitting)    Pulse 87    Temp 36.4 °C (97.6 °F)    Resp 18    Ht 5' 4\" (1.626 m)    Wt 81.2 kg (179 lb)    SpO2 98%    Breastfeeding Unknown    BMI 30.73 kg/m2       Patient is status post epidural, spinal anesthesia for * No procedures listed *. Nausea/Vomiting: None    Postoperative hydration reviewed and adequate. Pain:  Pain Scale 1: Numeric (0 - 10) (07/02/17 1610)  Pain Intensity 1: 4 (07/02/17 1610)   Managed    Neurological Status:   Neuro (WDL): Within Defined Limits (07/01/17 1900)   At baseline    Mental Status and Level of Consciousness: Arousable    Pulmonary Status:   O2 Device: Room air (07/02/17 0730)   Adequate oxygenation and airway patent    Complications related to anesthesia: None    Post-anesthesia assessment completed.  No concerns    Signed By: Chino Perea MD     July 2, 2017

## 2017-07-03 NOTE — ROUTINE PROCESS
Bedside and Verbal shift change report given to RANDY Hughes, [address] (oncoming nurse) by Christos Owusu. Joycelyn Dong (offgoing nurse). Report included the following information SBAR.

## 2017-07-03 NOTE — PROGRESS NOTES
Post-Partum Day Number 1 Progress Note    Naa Marin     Assessment: Doing well, post partum day 1    Plan:  1. Continue routine postpartum and perineal care as well as maternal education. 2. The risks and benefits of the circumcision  procedure and anesthesia including: bleeding, infection, variability of cosmetic results were discussed at length with the mother. She is aware that future repeat procedures may be necessary. She gives informed consent to proceed as noted and her questions are answered. Information for the patient's :  Fara Earing [[id number]]   Vaginal, Vacuum (Extractor)   Patient doing well without significant complaint. Voiding without difficulty, normal lochia. Vitals:  Visit Vitals    /70 (BP 1 Location: Left arm, BP Patient Position: Sitting)    Pulse 80    Temp 97.9 °F (36.6 °C)    Resp 16    Ht 5' 4\" (1.626 m)    Wt 81.2 kg (179 lb)    SpO2 98%    Breastfeeding Unknown    BMI 30.73 kg/m2     Temp (24hrs), Av °F (36.7 °C), Min:97.6 °F (36.4 °C), Max:98.4 °F (36.9 °C)        Exam:   Patient without distress. Abdomen soft, fundus firm, nontender                Lower extremities are negative for swelling, cords or tenderness.     Labs:     Lab Results   Component Value Date/Time    WBC 9.7 2017 04:31 PM    WBC 9.9 2012 01:15 AM    WBC 4.9 2010 02:35 PM    WBC 7.2 2009 12:30 AM    WBC 6.6 2009 12:35 AM    HGB 11.8 2017 04:31 PM    HGB 11.9 2012 01:15 AM    HGB 13.2 2010 02:35 PM    HGB 13.3 2009 12:30 AM    HGB 13.6 2009 12:35 AM    HCT 34.2 2017 04:31 PM    HCT 34.5 2012 01:15 AM    HCT 37.6 2010 02:35 PM    HCT 39.5 2009 12:30 AM    HCT 39.2 2009 12:35 AM    PLATELET 820  04:31 PM    PLATELET 490  01:15 AM    PLATELET 690  02:35 PM    PLATELET 609  12:30 AM    PLATELET 715 10//4831 12:35 AM       No results found for this or any previous visit (from the past 24 hour(s)).

## 2017-07-03 NOTE — PROGRESS NOTES
Bedside shift change report given to KIT Turcios RN (oncoming nurse) by KUSUM Franco (offgoing nurse). Report given with SBAR.

## 2017-07-03 NOTE — LACTATION NOTE
This note was copied from a baby's chart. Day 1 progress note  Am wt assessment -4.1 %  10 baby led feeds, latch of 9-9-9 observed and recorded. 5 wet and 3 stools. Reviewed 1st week expectations, breastfeeding support through [facility] and [facility] support groups. Management of engorgement reviewed. Encouraged breast pump benefit for prn uses. Cool compresses after feedings and recline. Mother quiet without questions or concerns. AWP # also provided for support after discharge.

## 2017-07-03 NOTE — ADT AUTH CERT NOTES
Patient Demographics        Patient Name [address] Sex  Address Phone       Radha Urbano [phone] Female 1985 [address]/ [place]  [phone] (Home)           CSN:       [id number]           Admit Date: Admit Time Room Bed       2017  3:09 PM 3304 [[id number]] 01 [[id number]]           Attending Providers        Provider Pager From To       Terrell Gallo MD  17       ADM  DEL   Delivery Date: 2017   Delivery Time: 4:38 AM   Delivery Type: Vaginal, Vacuum (Extractor)  Sex:  male    Gestational Age: 38w7d     [place] Measurements:  Birth Weight: 2.865 kg    Birth Length: 19.5\"          Delivery Clinician:  Tyrone Lux?:   Delivery Location: L&D

## 2017-07-04 VITALS
DIASTOLIC BLOOD PRESSURE: 67 MMHG | TEMPERATURE: 98.2 F | HEIGHT: 64 IN | WEIGHT: 179 LBS | HEART RATE: 85 BPM | RESPIRATION RATE: 16 BRPM | SYSTOLIC BLOOD PRESSURE: 109 MMHG | OXYGEN SATURATION: 98 % | BODY MASS INDEX: 30.56 KG/M2

## 2017-07-04 PROCEDURE — 74011250637 HC RX REV CODE- 250/637: Performed by: OBSTETRICS & GYNECOLOGY

## 2017-07-04 RX ORDER — IBUPROFEN 600 MG/1
600 TABLET ORAL
Qty: 36 TAB | Refills: 0 | Status: SHIPPED | OUTPATIENT
Start: 2017-07-04 | End: 2018-07-11

## 2017-07-04 RX ADMIN — IBUPROFEN 800 MG: 400 TABLET, FILM COATED ORAL at 04:49

## 2017-07-04 NOTE — DISCHARGE INSTRUCTIONS
POST DELIVERY DISCHARGE INSTRUCTIONS    Name: vEa Selby  YOB: 1985  Primary Diagnosis: Principal Problem:    Premature rupture of membranes (PROM), onset of labor within 24 hours, antepartum, full term (7/2/2017)    Active Problems:    Pregnancy (7/1/2017)      Labor and delivery complicated by cord around neck with compression (7/2/2017)        General:     Diet/Diet Restrictions:  · Eight 8-ounce glasses of fluid daily (water, juices); avoid excessive caffeine intake. · Meals/snacks as desired which are high in fiber and carbohydrates and low in fat and cholesterol. Physical Activity / Restrictions / Safety:     · Avoid heavy lifting, no more that 8 lbs. For 2-3 weeks;   · Limit use of stairs to 2 times daily for the first week home. · No driving for one week. · Avoid intercourse 4-6 weeks, no douching or tampon use. · Check with obstetrician before starting or resuming an exercise program.      Discharge Instructions/Special Treatment/Home Care Needs:     · Continue prenatal vitamins. · Continue to use squirt bottle with warm water on your episiotomy after each bathroom use until bleeding stops. · If steri-strips applied to your incision, remove in 7-10 days. Call your doctor for the following:     · Fever over 101 degrees by mouth. · Vaginal bleeding heavier than a normal menstrual period or clots larger than a golf ball. · Red streaks or increased swelling of legs, painful red streaks on your breast.  · Painful urination, constipation and increased pain or swelling or discharge with your incision. · If you feel extremely anxious or overwhelmed. · If you have thoughts of harming yourself and/or your baby. Pain Management:     · Take Acetaminophen (Tylenol) or Ibuprofen (Advil, Motrin), as directed for pain. · Use a warm Sitz bath 3 times daily to relieve episiotomy or hemorrhoidal discomfort.    · For hemorrhoidal discomfort, use Tucks and Anusol cream as needed and directed. · Heating pad to  incision as needed. Follow-Up Care:     Appointment with MD:   Follow-up Appointments   Procedures    FOLLOW UP VISIT Appointment in: 6 Weeks     Standing Status:   Standing     Number of Occurrences:   1     Order Specific Question:   Appointment in     Answer:   Hannah Terrazas     Telephone number: [phone]    Signed By: Marta Patrick MD                                                                                                   Date: 2017 Time: 8:53 AM    Vaginal Childbirth (Postpartum Care): After Your Visit     Your Care Instructions    After childbirth (postpartum period), your body goes through many changes. Some of these changes happen over several weeks. It is easy to get too tired and overwhelmed during the first weeks after childbirth. Take it easy on yourself. You may find it hard to meet the extra demands on your energy and time. Get rest whenever you can, accept help from others, and eat well and drink plenty of fluids. Your body will slowly heal in the next few weeks. You may feel emotional during this time. Changes in your hormones can shift your mood without warning. No heavy lifting. No more than 8 lbs or the size of baby for 2-3 weeks. Avoid stairs. Limit to 1-2 times per day for the 1st week after delivery. No driving for the first week after delivery. Follow-up care is a key part of your treatment and safety. Be sure to make and go to all appointments, and call your doctor if you are having problems. Its also a good idea to know your test results and keep a list of the medicines you take. Around 4 to 6 weeks after your baby's birth, you will have a follow-up visit with your doctor. This visit is your time to talk to your doctor about anything you are concerned or curious about. Keep a list of questions to bring to your postpartum visit.  Your questions might be about:  Changes in your breasts, such as lumps or soreness. When to expect your menstrual period to start again. What form of birth control is best for you. Weight you have put on during the pregnancy. Exercise options. What foods and drinks are best for you, especially if you are breast-feeding. Problems you might be having with breast-feeding. When you can have sex. Some women may want to talk about lubricants for the vagina. Any feelings of sadness or restlessness that you are having. How can you care for yourself at home? Vaginal bleeding and cramps  After delivery, you will have a bloody discharge from the vagina. This will turn pink within a week and then white or yellow after about 10 days. It may last for 2 to 4 weeks or longer, until the uterus has healed. Do not rinse inside your vagina with fluids (douche). Do not worry if you pass some blood clots, as long as they are smaller than a golf ball. If you have a tear or stitches in your vaginal area, change the pad at least every 4 hours to prevent soreness and infection. You may have cramps for the first few days after childbirth. These are normal and occur as the uterus shrinks to normal size. Take an over-the-counter pain medicine, such as acetaminophen (Tylenol), ibuprofen (Advil, Motrin), or naproxen (Aleve), for cramps. Read and follow all instructions on the label. Do not take aspirin, because it can cause more bleeding. Do not take two or more pain medicines at the same time unless the doctor told you to. Many pain medicines have acetaminophen, which is Tylenol. Too much acetaminophen (Tylenol) can be harmful. Stitches  If you have stitches, they will dissolve on their own and do not need to be removed. Follow your doctor's instructions for cleaning the stitched area. Put ice or a cold pack on your painful area for 10 to 20 minutes at a time. , several times a day, for the first few days. Put a thin cloth between the ice and your skin.   Sit in a few inches of warm water (sitz bath) 3 times a day and after bowel movements. The warm water helps with pain and itching. If you do not have a tub, a warm shower might help. Keep the area clean by pouring or spraying warm water over the area outside your vagina and anus after you use the toilet. Breast fullness  Your breasts may overfill (engorge) in the first few days after delivery. To help milk flow and to relieve pain, warm your breasts in the shower or by using warm, moist towels before nursing. If you are not nursing, do not put warmth on your breasts or touch your breasts. Wear a tight bra or sports bra and use ice until the fullness goes away. This usually takes 2 to 3 days. Put ice or a cold pack on your breast after nursing to reduce swelling and pain. Put a thin cloth between the ice and your skin. Activity  Eat a balanced diet. Do not try to lose weight by cutting calories. Keep taking your prenatal vitamins, or take a multivitamin. ·  Get help with household chores from family or friends, if you can. Do not try to do it all yourself. Get as much rest as you can. Try to take naps when your baby sleeps during the day. Get some exercise every day. But do not do any heavy exercise until your doctor says it is okay. Do not have sex or use tampons until you have stopped bleeding and at least 4 to 6 weeks have passed since you gave birth. Talk to your doctor about birth control. You can get pregnant even before your period returns. Also, you can get pregnant while you are breast-feeding. Mental health  It is normal to have some sadness, anxiety, sleeplessness, and mood swings after you go home. If you feel upset or hopeless for more than a few days or are having trouble doing the things you need to do, talk to your doctor. · Many women get the \"baby blues\" during the first few days after childbirth. The \"baby blues\" usually peak around the fourth day and then ease up in less than 2 weeks.  If you have the \"baby blues\" for more than a few days, or if you have thoughts of hurting yourself or your baby, call your doctor right away. Constipation and hemorrhoids  Drink plenty of fluids, enough so that your urine is light yellow or clear like water. If you have kidney, heart, or liver disease and have to limit fluids, talk with your doctor before you increase the amount of fluids you drink. Eat plenty of fiber each day to avoid constipation. Include foods such as whole-grain breads and cereals, raw vegetables, raw and dried fruits, and beans. · If you have hemorrhoids or swelling or pain around the opening of your vagina, try using cold and heat. You can put ice or a cold pack on the area for 10 to 20 minutes at a time. Put a thin cloth between the ice and your skin. Also try sitting in a few inches of warm water (sitz bath) 3 times a day and after bowel movements. When should you call for help? Call 911 anytime you think you may need emergency care. For example, call if:  You are thinking of hurting yourself, your baby, or anyone else. You have sudden, severe pain in your belly. You passed out (lost consciousness). Call your doctor now or seek immediate medical care if:  You have severe vaginal bleeding. You are passing blood clots and soaking through a pad each hour for 2 or more hours. Your vaginal bleeding seems to be getting heavier or is still bright red 4 days after delivery, or you pass blood clots larger than the size of a golf ball. You are dizzy or lightheaded, or you feel like you may faint. You are vomiting or cannot keep fluids down. You have a fever. You have new or more belly pain. You pass tissue (not just blood). Your vaginal discharge smells bad. Your belly feels tender or full and hard. Your breasts are continuously painful or red. You feel sad, anxious, or hopeless for more than a few days. Watch closely for changes in your health, and be sure to contact your doctor if you have any problems.     Where can you learn more? Go to [employer]    Enter V131  in the search box to learn more about \"Postpartum Care: After Your Visit\". or    Go to [employer]    Enter Q237  in the search box to learn more about \"Vaginal Childbirth: After Your Visit\". © 3221-5404 Healthwise, Incorporated. Care instructions adapted under license by [facility] (which disclaims liability or warranty for this information). This care instruction is for use with your licensed healthcare professional. If you have questions about a medical condition or this instruction, always ask your healthcare professional. Analia Anthony any warranty or liability for your use of this information. Follow up with [facility] in 6 weeks; call to schedule appointment.

## 2017-07-04 NOTE — PROGRESS NOTES
Post-Partum Day Number 2 Progress Note    Naa Marin     Assessment: Doing well, post partum day 2    Plan:   1. Discharge home today  2. Follow up in office in 6 weeks with Domingo Virk MD  3. Post partum activity advised, diet as tolerated  4. Discharge Medications: ibuprofen, percocet and medications prior to admission    Information for the patient's :  Johanna Giles [[id number]]   Vaginal, Vacuum (Extractor)   Patient doing well without significant complaint. Voiding without difficulty, normal lochia. Vitals:  Visit Vitals    /67 (BP 1 Location: Right arm, BP Patient Position: Sitting)    Pulse 85    Temp 98.2 °F (36.8 °C)    Resp 16    Ht 5' 4\" (1.626 m)    Wt 81.2 kg (179 lb)    SpO2 98%    Breastfeeding Unknown    BMI 30.73 kg/m2     Temp (24hrs), Av.5 °F (36.9 °C), Min:98 °F (36.7 °C), Max:99.3 °F (37.4 °C)      Exam:         Patient without distress. Abdomen soft, fundus firm, nontender                 Lower extremities are negative for swelling, cords or tenderness. Labs:     Lab Results   Component Value Date/Time    WBC 9.7 2017 04:31 PM    WBC 9.9 2012 01:15 AM    WBC 4.9 2010 02:35 PM    WBC 7.2 2009 12:30 AM    WBC 6.6 2009 12:35 AM    HGB 11.8 2017 04:31 PM    HGB 11.9 2012 01:15 AM    HGB 13.2 2010 02:35 PM    HGB 13.3 2009 12:30 AM    HGB 13.6 2009 12:35 AM    HCT 34.2 2017 04:31 PM    HCT 34.5 2012 01:15 AM    HCT 37.6 2010 02:35 PM    HCT 39.5 2009 12:30 AM    HCT 39.2 2009 12:35 AM    PLATELET 906  04:31 PM    PLATELET 757  01:15 AM    PLATELET 828  02:35 PM    PLATELET 501  12:30 AM    PLATELET 766  12:35 AM       No results found for this or any previous visit (from the past 24 hour(s)).

## 2017-07-04 NOTE — DISCHARGE SUMMARY
Obstetrical Discharge Summary     Name: Rakesh Turcios MRN: [id number]  SSN: [id number]    YOB: 1985  Age: 29 y.o. Sex: female      Admit Date: 2017    Discharge Date: 2017     Admitting Physician: Jhony Garza MD     Attending Physician:  Matthew Michelle MD     Admission Diagnoses: Pregnancy  Pregnancy    Discharge Diagnoses:   Information for the patient's :  Joyce Jansen [[id number]]   Delivery of a 2.865 kg male infant via Vaginal, Vacuum (Extractor) on 2017 at 4:38 AM  by . Apgars were 9 and 10. Additional Diagnoses:   Hospital Problems  Never Reviewed          Codes Class Noted POA    Labor and delivery complicated by cord around neck with compression ICD-10-CM: O69. 1XX0  ICD-9-CM: 663.10 End Stage 2017 Clinically Undetermined        * (Principal)Premature rupture of membranes (PROM), onset of labor within 24 hours, antepartum, full term ICD-10-CM: O42.019  ICD-9-CM: 658.13 Present on Admission 2017 Yes        Pregnancy ICD-10-CM: Z33.1  ICD-9-CM: V22.2  2017 Unknown             Lab Results   Component Value Date/Time    Rubella, External 1.52 immune 2017    GrBStrep, External positive  06/15/2017       Hospital Course: Normal hospital course following the delivery. Disposition at Discharge: Home or self care    Discharged Condition: Stable    Patient Instructions:   Current Discharge Medication List      START taking these medications    Details   ibuprofen (MOTRIN) 600 mg tablet Take 1 Tab by mouth every eight (8) hours as needed for Pain. Qty: 36 Tab, Refills: 0         CONTINUE these medications which have NOT CHANGED    Details   pnv w/o calcium-iron fum-fa 27-1 mg tab Take  by mouth. Indications: Pregnancy         STOP taking these medications       fluticasone (FLONASE) 50 mcg/actuation nasal spray Comments:   Reason for Stopping:               Reference my discharge instructions.     Follow-up Appointments   Procedures  FOLLOW UP VISIT Appointment in: 6 Weeks     Standing Status:   Standing     Number of Occurrences:   1     Order Specific Question:   Appointment in     Answer:   6 Weeks        Signed By:  Brendan Ahmadi MD     July 4, 2017

## 2017-07-04 NOTE — ROUTINE PROCESS
Bedside shift change report given to eMlissa , RNC     Report consisted of patients Situation, Background, Assessment and Recommendations(SBAR). Opportunity for questions and clarification was provided. Care relinquished.

## 2017-08-16 NOTE — PROGRESS NOTES
Problem: Falls - Risk of  Goal: *Absence of Falls  Document Kityt Fall Risk and appropriate interventions in the flowsheet.    Outcome: Progressing Towards Goal  Fall Risk Interventions: Spoke with dr reese, ok to dc pitocin at this time.

## 2018-07-11 ENCOUNTER — OFFICE VISIT (OUTPATIENT)
Dept: URGENT CARE | Age: 33
End: 2018-07-11

## 2018-07-11 VITALS
TEMPERATURE: 97.4 F | SYSTOLIC BLOOD PRESSURE: 122 MMHG | DIASTOLIC BLOOD PRESSURE: 59 MMHG | RESPIRATION RATE: 16 BRPM | OXYGEN SATURATION: 98 % | HEART RATE: 85 BPM | HEIGHT: 64 IN | BODY MASS INDEX: 25.95 KG/M2 | WEIGHT: 152 LBS

## 2018-07-11 DIAGNOSIS — M94.0 COSTOCHONDRITIS, ACUTE: ICD-10-CM

## 2018-07-11 DIAGNOSIS — R07.9 CHEST PAIN, UNSPECIFIED TYPE: Primary | ICD-10-CM

## 2018-07-11 RX ORDER — INDOMETHACIN 25 MG/1
25 CAPSULE ORAL
Qty: 40 CAP | Refills: 0 | Status: SHIPPED | OUTPATIENT
Start: 2018-07-11

## 2018-07-11 RX ORDER — INDOMETHACIN 25 MG/1
25 CAPSULE ORAL
Qty: 40 CAP | Refills: 0 | Status: SHIPPED | OUTPATIENT
Start: 2018-07-11 | End: 2018-07-11 | Stop reason: SDUPTHER

## 2018-07-11 NOTE — PATIENT INSTRUCTIONS
Costochondritis: Care Instructions Your Care Instructions You have chest pain because the cartilage of your rib cage is inflamed. This problem is called costochondritis. This type of chest wall pain may last from days to weeks. It is not a heart problem. Sometimes costochondritis occurs with a cold or the flu, and other times the exact cause is not known. Follow-up care is a key part of your treatment and safety. Be sure to make and go to all appointments, and call your doctor if you are having problems. It's also a good idea to know your test results and keep a list of the medicines you take. How can you care for yourself at home? · Take medicines for pain and inflammation exactly as directed. ¨ If the doctor gave you a prescription medicine, take it as prescribed. ¨ If you are not taking a prescription pain medicine, ask your doctor if you can take an over-the-counter medicine. ¨ Do not take two or more pain medicines at the same time unless the doctor told you to. Many pain medicines have acetaminophen, which is Tylenol. Too much acetaminophen (Tylenol) can be harmful. · It may help to use a warm compress or heating pad (set on low) on your chest. You can also try alternating heat and ice. Put ice or a cold pack on the area for 10 to 20 minutes at a time. Put a thin cloth between the ice and your skin. · Avoid any activity that strains the chest area. As your pain gets better, you can slowly return to your normal activities. · Do not use tape, an elastic bandage, a \"rib belt,\" or anything else that restricts your chest wall motion. When should you call for help? Call 911 anytime you think you may need emergency care. For example, call if: 
  · You have new or different chest pain or pressure. This may occur with: ¨ Sweating. ¨ Shortness of breath. ¨ Nausea or vomiting. ¨ Pain that spreads from the chest to the neck, jaw, or one or both shoulders or arms. ¨ Dizziness or lightheadedness.  
¨ A fast or uneven pulse. After calling 911, chew 1 adult-strength aspirin. Wait for an ambulance. Do not try to drive yourself.  
  · You have severe trouble breathing.  
 Call your doctor now or seek immediate medical care if: 
  · You have a fever or cough.  
  · You have any trouble breathing.  
  · Your chest pain gets worse.  
 Watch closely for changes in your health, and be sure to contact your doctor if: 
  · Your chest pain continues even though you are taking anti-inflammatory medicine.  
  · Your chest wall pain has not improved after 5 to 7 days. Where can you learn more? Go to http://isabella-aby.info/. Enter X902 in the search box to learn more about \"Costochondritis: Care Instructions. \" Current as of: November 20, 2017 Content Version: 11.7 © 8642-7574 Healthwise, Incorporated. Care instructions adapted under license by [employer] (which disclaims liability or warranty for this information). If you have questions about a medical condition or this instruction, always ask your healthcare professional. [address] any warranty or liability for your use of this information.

## 2018-07-11 NOTE — PROGRESS NOTES
Patient is a 34 y.o. female presenting with chest pain. Chest Pain The history is provided by the patient. This is a new problem. Episode onset: today. The problem has not changed since onset. The problem occurs constantly. The pain is associated with lifting and exertion (lifted her son on her shouklder yestrday and also somne activity at  home ). Pain location: anterior chest- diffuse but mopst on sternal  area  The pain is moderate. The quality of the pain is described as pressure-like. The pain does not radiate. The symptoms are aggravated by movement and palpation. Associated symptoms comments: none. She has tried nothing for the symptoms. Risk factors include no risk factors. Her past medical history does not include DVT or PE. History reviewed. No pertinent past medical history. History reviewed. No pertinent surgical history. History reviewed. No pertinent family history. Social History Social History  Marital status: SINGLE Spouse name: Justin Garcia  Number of children: 2  
 Years of education: 15 Occupational History  Not on file. Social History Main Topics  Smoking status: Never Smoker  Smokeless tobacco: Never Used  Alcohol use No  
 Drug use: No  
 Sexual activity: Yes  
  Partners: Male Birth control/ protection: None Other Topics Concern  Not on file Social History Narrative ALLERGIES: Review of patient's allergies indicates no known allergies. Review of Systems Cardiovascular: Positive for chest pain. All other systems reviewed and are negative. Vitals:  
 07/11/18 1441 BP: 122/59 Pulse: 85 Resp: 16 Temp: 97.4 °F (36.3 °C) SpO2: 98% Weight: 152 lb (68.9 kg) Height: 5' 4\" (1.626 m) Physical Exam  
Constitutional: No distress.   
HENT:  
Right Ear: Tympanic membrane and ear canal normal.  
Left Ear: Tympanic membrane and ear canal normal.  
Nose: Nose normal.  
Mouth/Throat: No oropharyngeal exudate, posterior oropharyngeal edema or posterior oropharyngeal erythema. Eyes: Conjunctivae are normal. Right eye exhibits no discharge. Left eye exhibits no discharge. Neck: Neck supple. Cardiovascular: Normal rate, regular rhythm and normal heart sounds. No murmur heard. Pulmonary/Chest: Effort normal and breath sounds normal. No respiratory distress. She has no wheezes. She has no rales. She exhibits tenderness. Lymphadenopathy:  
  She has no cervical adenopathy. Skin: No rash noted. Nursing note and vitals reviewed. [facility] 
 
EKG Date/Time: 7/11/2018 6:51 PM 
Performed by: Sanya Hood Authorized by: Sanya Hood Clinical impression: normal ECG 
 
 
  
 
 
  ICD-10-CM ICD-9-CM 1. Chest pain, unspecified type R07.9 786.50 EKG, 12 LEAD, INITIAL  
 non cardiac- EKG- NSR  
2. Costochondritis, acute M94.0 733.6 Medications Ordered Today Medications  DISCONTD: indomethacin (INDOCIN) 25 mg capsule Sig: Take 1 Cap by mouth three (3) times daily (after meals). Dispense:  40 Cap Refill:  0  
 indomethacin (INDOCIN) 25 mg capsule Sig: Take 1 Cap by mouth three (3) times daily (after meals). Dispense:  40 Cap Refill:  0 No results found for any visits on 07/11/18. The patients condition was discussed with the patient and they understand. The patient is to follow up with primary care doctor. If signs and symptoms become worse the pt is to go to the ER. The patient is to take medications as prescribed.

## 2018-07-11 NOTE — LETTER
[address]Mar[address] Bridges [address] 
[phone] Work/School Note Date: 7/11/2018 To Whom It May concern: 
 
Wendy Jain was seen and treated today in the urgent care center by the following provider, Benjamin Mehta. Lori Mcclure was accompanied by her  [place]. Wendy Jain can not lift for 2 days and will need her  home to help care for their infant son. Kassie Delgado may return to work July 13, 2018. Sincerely, Emperatriz Bloom

## 2018-07-11 NOTE — MR AVS SNAPSHOT
[address] [facility] [address] 
[phone] Patient: Kip Benítez MRN: [id number] Visit Information Date & Time Provider Department Dept. Phone Encounter #  
 7/11/2018  2:45 PM [facility] [phone] [id number] Follow-up Instructions Return if symptoms worsen or fail to improve. Upcoming Health Maintenance Date Due  
 PAP AKA CERVICAL CYTOLOGY 3/24/2008 DTaP/Tdap/Td series (1 - Tdap) 10/2/2010 Influenza Age 5 to Adult 8/1/2018 Allergies as of 7/11/2018  Review Complete On: 7/11/2018 By: Bernard Monge RN No Known Allergies Current Immunizations  Reviewed on 3/6/2012 Name Date RHOGAM INJECTION 11/21/2010  8:46 PM  
 TD Vaccine 10/1/2010 10:41 AM  
  
 Not reviewed this visit You Were Diagnosed With   
  
 Codes Comments Chest pain, unspecified type    -  Primary ICD-10-CM: R07.9 ICD-9-CM: 786.50 non cardiac- EKG- NSR Costochondritis, acute     ICD-10-CM: M94.0 ICD-9-CM: 733.6 Vitals BP Pulse Temp Resp Height(growth percentile) Weight(growth percentile) 122/59 85 97.4 °F (36.3 °C) 16 5' 4\" (1.626 m) 152 lb (68.9 kg) LMP SpO2 BMI OB Status Smoking Status 07/04/2018 98% 26.09 kg/m2 Having regular periods Never Smoker BMI and BSA Data Body Mass Index Body Surface Area 26.09 kg/m 2 1.76 m 2 Preferred Pharmacy Pharmacy Name Phone [facility] [address] [place] [address] [phone] Your Updated Medication List  
  
   
This list is accurate as of 7/11/18  3:03 PM.  Always use your most recent med list.  
  
  
  
  
 indomethacin 25 mg capsule Commonly known as:  INDOCIN Take 1 Cap by mouth three (3) times daily (after meals). Prescriptions Sent to Pharmacy  Refills  
 indomethacin (INDOCIN) 25 mg capsule 0  
 Sig: Take 1 Cap by mouth three (3) times daily (after meals). Class: Normal  
 Pharmacy: [address] [facility], [address] [place] [address] #: [phone] Route: Oral  
  
We Performed the Following EKG, 12 LEAD, INITIAL [26003 CPT(R)] Follow-up Instructions Return if symptoms worsen or fail to improve. Patient Instructions Costochondritis: Care Instructions Your Care Instructions You have chest pain because the cartilage of your rib cage is inflamed. This problem is called costochondritis. This type of chest wall pain may last from days to weeks. It is not a heart problem. Sometimes costochondritis occurs with a cold or the flu, and other times the exact cause is not known. Follow-up care is a key part of your treatment and safety. Be sure to make and go to all appointments, and call your doctor if you are having problems. It's also a good idea to know your test results and keep a list of the medicines you take. How can you care for yourself at home? · Take medicines for pain and inflammation exactly as directed. ¨ If the doctor gave you a prescription medicine, take it as prescribed. ¨ If you are not taking a prescription pain medicine, ask your doctor if you can take an over-the-counter medicine. ¨ Do not take two or more pain medicines at the same time unless the doctor told you to. Many pain medicines have acetaminophen, which is Tylenol. Too much acetaminophen (Tylenol) can be harmful. · It may help to use a warm compress or heating pad (set on low) on your chest. You can also try alternating heat and ice. Put ice or a cold pack on the area for 10 to 20 minutes at a time. Put a thin cloth between the ice and your skin. · Avoid any activity that strains the chest area. As your pain gets better, you can slowly return to your normal activities. · Do not use tape, an elastic bandage, a \"rib belt,\" or anything else that restricts your chest wall motion. When should you call for help? Call 911 anytime you think you may need emergency care. For example, call if: 
  · You have new or different chest pain or pressure. This may occur with: ¨ Sweating. ¨ Shortness of breath. ¨ Nausea or vomiting. ¨ Pain that spreads from the chest to the neck, jaw, or one or both shoulders or arms. ¨ Dizziness or lightheadedness. ¨ A fast or uneven pulse. After calling 911, chew 1 adult-strength aspirin. Wait for an ambulance. Do not try to drive yourself.  
  · You have severe trouble breathing.  
 Call your doctor now or seek immediate medical care if: 
  · You have a fever or cough.  
  · You have any trouble breathing.  
  · Your chest pain gets worse.  
 Watch closely for changes in your health, and be sure to contact your doctor if: 
  · Your chest pain continues even though you are taking anti-inflammatory medicine.  
  · Your chest wall pain has not improved after 5 to 7 days. Where can you learn more? Go to http://isabella-aby.info/. Enter K029 in the search box to learn more about \"Costochondritis: Care Instructions. \" Current as of: November 20, 2017 Content Version: 11.7 © 1313-9698 [employer]. Care instructions adapted under license by [employer] (which disclaims liability or warranty for this information). If you have questions about a medical condition or this instruction, always ask your healthcare professional. [place] [address] any warranty or liability for your use of this information. Introducing [facility]! Susie Pineda introduces [employer] patient portal. Now you can access parts of your medical record, email your doctor's office, and request medication refills online. 1. In your internet browser, go to [email or web address] 2. Click on the First Time User? Click Here link in the Sign In box. You will see the New Member Sign Up page. 3. Enter your GameWith Access Code exactly as it appears below. You will not need to use this code after youve completed the sign-up process. If you do not sign up before the expiration date, you must request a new code. · Game[employer] Access Code: [email or web address] Expires: 10/9/2018  2:41 PM 
 
4. Enter the last four digits of your Social Security Number (xxxx) and Date of Birth (mm/dd/yyyy) as indicated and click Submit. You will be taken to the next sign-up page. 5. Create a GameWith ID. This will be your GameWith login ID and cannot be changed, so think of one that is secure and easy to remember. 6. Create a [employer] password. You can change your password at any time. 7. Enter your Password Reset Question and Answer. This can be used at a later time if you forget your password. 8. Enter your e-mail address. You will receive e-mail notification when new information is available in [address]. 9. Click Sign Up. You can now view and download portions of your medical record. 10. Click the Download Summary menu link to download a portable copy of your medical information. If you have questions, please visit the Frequently Asked Questions section of the [employer] website. Remember, GameWith is NOT to be used for urgent needs. For medical emergencies, dial 911. Now available from your iPhone and Android! Please provide this summary of care documentation to your next provider. Your primary care clinician is listed as PROVIDER UNKNOWN. If you have any questions after today's visit, please call [phone].